# Patient Record
Sex: MALE | Race: WHITE | Employment: FULL TIME | URBAN - NONMETROPOLITAN AREA
[De-identification: names, ages, dates, MRNs, and addresses within clinical notes are randomized per-mention and may not be internally consistent; named-entity substitution may affect disease eponyms.]

---

## 2024-03-27 ENCOUNTER — OFFICE VISIT (OUTPATIENT)
Dept: FAMILY MEDICINE CLINIC | Age: 34
End: 2024-03-27
Payer: COMMERCIAL

## 2024-03-27 VITALS
DIASTOLIC BLOOD PRESSURE: 80 MMHG | SYSTOLIC BLOOD PRESSURE: 112 MMHG | HEART RATE: 62 BPM | OXYGEN SATURATION: 98 % | RESPIRATION RATE: 16 BRPM | TEMPERATURE: 98 F | HEIGHT: 73 IN | BODY MASS INDEX: 31.28 KG/M2 | WEIGHT: 236 LBS

## 2024-03-27 DIAGNOSIS — M25.551 RIGHT HIP PAIN: Primary | ICD-10-CM

## 2024-03-27 PROCEDURE — 99203 OFFICE O/P NEW LOW 30 MIN: CPT | Performed by: FAMILY MEDICINE

## 2024-03-27 PROCEDURE — 96372 THER/PROPH/DIAG INJ SC/IM: CPT | Performed by: FAMILY MEDICINE

## 2024-03-27 RX ORDER — PREDNISONE 5 MG/1
5 TABLET ORAL DAILY
Qty: 21 TABLET | Refills: 0 | Status: SHIPPED | OUTPATIENT
Start: 2024-03-27 | End: 2024-04-02

## 2024-03-27 RX ORDER — NAPROXEN 500 MG/1
500 TABLET ORAL 2 TIMES DAILY WITH MEALS
Qty: 60 TABLET | Refills: 1 | Status: SHIPPED | OUTPATIENT
Start: 2024-03-27 | End: 2024-05-26

## 2024-03-27 RX ORDER — METHYLPREDNISOLONE SODIUM SUCCINATE 125 MG/2ML
125 INJECTION, POWDER, LYOPHILIZED, FOR SOLUTION INTRAMUSCULAR; INTRAVENOUS ONCE
Status: COMPLETED | OUTPATIENT
Start: 2024-03-27 | End: 2024-03-27

## 2024-03-27 RX ORDER — METHYLPREDNISOLONE SODIUM SUCCINATE 125 MG/2ML
125 INJECTION, POWDER, LYOPHILIZED, FOR SOLUTION INTRAMUSCULAR; INTRAVENOUS ONCE
Status: DISCONTINUED | OUTPATIENT
Start: 2024-03-27 | End: 2024-04-01

## 2024-03-27 RX ADMIN — METHYLPREDNISOLONE SODIUM SUCCINATE 125 MG: 125 INJECTION, POWDER, LYOPHILIZED, FOR SOLUTION INTRAMUSCULAR; INTRAVENOUS at 17:17

## 2024-03-27 SDOH — ECONOMIC STABILITY: INCOME INSECURITY: HOW HARD IS IT FOR YOU TO PAY FOR THE VERY BASICS LIKE FOOD, HOUSING, MEDICAL CARE, AND HEATING?: NOT HARD AT ALL

## 2024-03-27 SDOH — ECONOMIC STABILITY: FOOD INSECURITY: WITHIN THE PAST 12 MONTHS, YOU WORRIED THAT YOUR FOOD WOULD RUN OUT BEFORE YOU GOT MONEY TO BUY MORE.: NEVER TRUE

## 2024-03-27 SDOH — ECONOMIC STABILITY: FOOD INSECURITY: WITHIN THE PAST 12 MONTHS, THE FOOD YOU BOUGHT JUST DIDN'T LAST AND YOU DIDN'T HAVE MONEY TO GET MORE.: NEVER TRUE

## 2024-03-27 SDOH — ECONOMIC STABILITY: HOUSING INSECURITY
IN THE LAST 12 MONTHS, WAS THERE A TIME WHEN YOU DID NOT HAVE A STEADY PLACE TO SLEEP OR SLEPT IN A SHELTER (INCLUDING NOW)?: NO

## 2024-03-27 ASSESSMENT — PATIENT HEALTH QUESTIONNAIRE - PHQ9
SUM OF ALL RESPONSES TO PHQ9 QUESTIONS 1 & 2: 0
SUM OF ALL RESPONSES TO PHQ QUESTIONS 1-9: 0
SUM OF ALL RESPONSES TO PHQ QUESTIONS 1-9: 0
1. LITTLE INTEREST OR PLEASURE IN DOING THINGS: NOT AT ALL
SUM OF ALL RESPONSES TO PHQ QUESTIONS 1-9: 0
SUM OF ALL RESPONSES TO PHQ QUESTIONS 1-9: 0
2. FEELING DOWN, DEPRESSED OR HOPELESS: NOT AT ALL

## 2024-03-27 NOTE — PROGRESS NOTES
SUBJECTIVE:    Patient ID: Clay Huber is a 34 y.o. male.    Chief Complaint   Patient presents with    New Patient    Hip Pain     X 7 days, right hip pain.         HPI: office visit    Review of Systems   Musculoskeletal:  Positive for arthralgias, gait problem and myalgias.   All other systems reviewed and are negative.       OBJECTIVE:  /80   Pulse 62   Temp 98 °F (36.7 °C) (Infrared)   Resp 16   Ht 1.854 m (6' 1\")   Wt 107 kg (236 lb)   SpO2 98% Comment: ra  BMI 31.14 kg/m²    Wt Readings from Last 3 Encounters:   03/27/24 107 kg (236 lb)   07/25/16 98 kg (216 lb)     BP Readings from Last 3 Encounters:   03/27/24 112/80   07/25/16 122/74      Pulse Readings from Last 3 Encounters:   03/27/24 62   07/25/16 60     Body mass index is 31.14 kg/m².   Resp Readings from Last 3 Encounters:   03/27/24 16   07/25/16 16     Past medical, surgical, family and social history were reviewed and updated with the patient.     Physical Exam  Vitals and nursing note reviewed.   Constitutional:       Appearance: He is well-developed.   HENT:      Head: Normocephalic and atraumatic.   Eyes:      Pupils: Pupils are equal, round, and reactive to light.   Cardiovascular:      Rate and Rhythm: Normal rate and regular rhythm.      Heart sounds: Normal heart sounds.   Pulmonary:      Effort: Pulmonary effort is normal.      Breath sounds: Normal breath sounds.   Abdominal:      General: Bowel sounds are normal.      Palpations: Abdomen is soft.   Musculoskeletal:      Cervical back: Normal range of motion and neck supple.      Lumbar back: Spasms and tenderness present. Decreased range of motion.      Right hip: Tenderness present. Decreased range of motion. Decreased strength.   Skin:     General: Skin is warm and dry.   Neurological:      Mental Status: He is alert and oriented to person, place, and time.          No results found for requested labs within last 30 days.     No results found for: \"LABA1C\", \"LDLCALC\",

## 2024-03-27 NOTE — PROGRESS NOTES
Chief Complaint   Patient presents with    New Patient    Hip Pain     X 7 days, right hip pain.         Have you seen any other physician or provider since your last visit no    Have you had any other diagnostic tests since your last visit? no    Have you changed or stopped any medications since your last visit? no   Administrations This Visit       methylPREDNISolone sodium succ (SOLU-MEDROL) injection 125 mg       Admin Date  03/27/2024  17:17 Action  Given Dose  125 mg Route  IntraMUSCular Site  Dorsogluteal Right Administered By  Veronica Villatoro MA    Ordering Provider: Trixie Swanson MD    NDC: 52303-117-67    : [employer]    Patient Supplied?: No                 Patient tolerated injection well. Patient advised to wait 20 minutes in the office following the injection. No signs/symptoms of reaction noted after 20 minutes.

## 2024-06-04 ENCOUNTER — OFFICE VISIT (OUTPATIENT)
Dept: FAMILY MEDICINE CLINIC | Age: 34
End: 2024-06-04
Payer: COMMERCIAL

## 2024-06-04 ENCOUNTER — HOSPITAL ENCOUNTER (OUTPATIENT)
Facility: HOSPITAL | Age: 34
Discharge: HOME | End: 2024-06-04
Payer: COMMERCIAL

## 2024-06-04 VITALS
WEIGHT: 231.8 LBS | TEMPERATURE: 97.7 F | DIASTOLIC BLOOD PRESSURE: 82 MMHG | SYSTOLIC BLOOD PRESSURE: 116 MMHG | OXYGEN SATURATION: 98 % | BODY MASS INDEX: 30.72 KG/M2 | HEART RATE: 77 BPM | HEIGHT: 73 IN | RESPIRATION RATE: 16 BRPM

## 2024-06-04 DIAGNOSIS — Z12.5 SCREENING FOR PROSTATE CANCER: ICD-10-CM

## 2024-06-04 DIAGNOSIS — Z13.220 SCREENING, LIPID: ICD-10-CM

## 2024-06-04 DIAGNOSIS — R53.83 OTHER FATIGUE: ICD-10-CM

## 2024-06-04 DIAGNOSIS — E55.9 VITAMIN D DEFICIENCY: ICD-10-CM

## 2024-06-04 DIAGNOSIS — M25.551 RIGHT HIP PAIN: Primary | ICD-10-CM

## 2024-06-04 PROCEDURE — 80061 LIPID PANEL: CPT

## 2024-06-04 PROCEDURE — 80053 COMPREHEN METABOLIC PANEL: CPT

## 2024-06-04 PROCEDURE — 36415 COLL VENOUS BLD VENIPUNCTURE: CPT

## 2024-06-04 PROCEDURE — 99214 OFFICE O/P EST MOD 30 MIN: CPT | Performed by: FAMILY MEDICINE

## 2024-06-04 PROCEDURE — 85027 COMPLETE CBC AUTOMATED: CPT

## 2024-06-04 PROCEDURE — 82607 VITAMIN B-12: CPT

## 2024-06-04 PROCEDURE — 84153 ASSAY OF PSA TOTAL: CPT

## 2024-06-04 PROCEDURE — 82746 ASSAY OF FOLIC ACID SERUM: CPT

## 2024-06-04 PROCEDURE — 82306 VITAMIN D 25 HYDROXY: CPT

## 2024-06-04 PROCEDURE — 84443 ASSAY THYROID STIM HORMONE: CPT

## 2024-06-04 PROCEDURE — 84439 ASSAY OF FREE THYROXINE: CPT

## 2024-06-04 RX ORDER — MELOXICAM 15 MG/1
15 TABLET ORAL DAILY
COMMUNITY
Start: 2024-05-14

## 2024-06-04 NOTE — PROGRESS NOTES
SUBJECTIVE:    Patient ID: Clay Huber is a 34 y.o. male.    Chief Complaint   Patient presents with    Hip Pain     Right hip pain, patient started PT.        HPI: office visit  He is in the office today for follow-up with his hip pain.  He is doing some physical therapy.  He has been to see the specialist.  They did think he has a torn labrum in that hip.  He says it is still hurting him quite a bit.  He says he is trying to be active.  His blood pressures have been doing okay.  He is not having any chest pain or shortness of breath.  He is needing some screening lab work.    Review of Systems   Musculoskeletal:  Positive for arthralgias, gait problem and myalgias.   All other systems reviewed and are negative.       OBJECTIVE:  /82   Pulse 77   Temp 97.7 °F (36.5 °C) (Infrared)   Resp 16   Ht 1.854 m (6' 1\")   Wt 105.1 kg (231 lb 12.8 oz)   SpO2 98% Comment: ra  BMI 30.58 kg/m²    Wt Readings from Last 3 Encounters:   06/04/24 105.1 kg (231 lb 12.8 oz)   03/27/24 107 kg (236 lb)   07/25/16 98 kg (216 lb)     BP Readings from Last 3 Encounters:   06/04/24 116/82   03/27/24 112/80   07/25/16 122/74      Pulse Readings from Last 3 Encounters:   06/04/24 77   03/27/24 62   07/25/16 60     Body mass index is 30.58 kg/m².   Resp Readings from Last 3 Encounters:   06/04/24 16   03/27/24 16   07/25/16 16     Past medical, surgical, family and social history were reviewed and updated with the patient.     Physical Exam  Vitals and nursing note reviewed.   Constitutional:       Appearance: He is well-developed.   HENT:      Head: Normocephalic and atraumatic.   Eyes:      Pupils: Pupils are equal, round, and reactive to light.   Cardiovascular:      Rate and Rhythm: Normal rate and regular rhythm.      Heart sounds: Normal heart sounds.   Pulmonary:      Effort: Pulmonary effort is normal.      Breath sounds: Normal breath sounds.   Abdominal:      General: Bowel sounds are normal.      Palpations: Abdomen

## 2024-06-04 NOTE — PROGRESS NOTES
No chief complaint on file.      Have you seen any other physician or provider since your last visit yes -   Bubba LOVETT [facility] Specialist  [facility]    Have you had any other diagnostic tests since your last visit? Yes   Right Arthrogram  MRI   Xray hip  Xray lumbar spine    Have you changed or stopped any medications since your last visit? no

## 2024-06-05 DIAGNOSIS — E87.5 SERUM POTASSIUM ELEVATED: Primary | ICD-10-CM

## 2024-06-05 LAB
25(OH)D3 SERPL-MCNC: 27.1 NG/ML (ref 32–100)
ALBUMIN SERPL-MCNC: 4.7 G/DL (ref 3.4–4.8)
ALBUMIN/GLOB SERPL: 1.7 {RATIO} (ref 0.8–2)
ALP SERPL-CCNC: 50 U/L (ref 25–100)
ALT SERPL-CCNC: 28 U/L (ref 4–36)
ANION GAP SERPL CALCULATED.3IONS-SCNC: 12 MMOL/L (ref 3–16)
AST SERPL-CCNC: 23 U/L (ref 8–33)
BILIRUB SERPL-MCNC: 0.6 MG/DL (ref 0.3–1.2)
BUN SERPL-MCNC: 12 MG/DL (ref 6–20)
CALCIUM SERPL-MCNC: 10 MG/DL (ref 8.5–10.5)
CHLORIDE SERPL-SCNC: 101 MMOL/L (ref 98–107)
CHOLEST SERPL-MCNC: 215 MG/DL (ref 0–200)
CO2 SERPL-SCNC: 27 MMOL/L (ref 20–30)
CREAT SERPL-MCNC: 0.9 MG/DL (ref 0.4–1.2)
ERYTHROCYTE [DISTWIDTH] IN BLOOD BY AUTOMATED COUNT: 12.5 % (ref 11–16)
FOLATE SERPL-MCNC: 11.89 NG/ML
GFR SERPLBLD CREATININE-BSD FMLA CKD-EPI: >90 ML/MIN/{1.73_M2}
GLOBULIN SER CALC-MCNC: 2.7 G/DL
GLUCOSE SERPL-MCNC: 74 MG/DL (ref 74–106)
HCT VFR BLD AUTO: 42.5 % (ref 40–54)
HDLC SERPL-MCNC: 53 MG/DL (ref 40–60)
HGB BLD-MCNC: 14.1 G/DL (ref 13–18)
LDLC SERPL CALC-MCNC: 146 MG/DL
MCH RBC QN AUTO: 28.7 PG (ref 27–32)
MCHC RBC AUTO-ENTMCNC: 33.2 G/DL (ref 31–35)
MCV RBC AUTO: 86.6 FL (ref 80–100)
PLATELET # BLD AUTO: 462 K/UL (ref 150–400)
PMV BLD AUTO: 10.2 FL (ref 6–10)
POTASSIUM SERPL-SCNC: 5.7 MMOL/L (ref 3.4–5.1)
PROT SERPL-MCNC: 7.4 G/DL (ref 6.4–8.3)
PSA SERPL DL<=0.01 NG/ML-MCNC: 1.86 NG/ML (ref 0–4)
RBC # BLD AUTO: 4.91 M/UL (ref 4.5–6)
SODIUM SERPL-SCNC: 140 MMOL/L (ref 136–145)
T4 FREE SERPL-MCNC: 1.3 NG/DL (ref 0.92–1.68)
TRIGL SERPL-MCNC: 79 MG/DL (ref 0–249)
TSH SERPL DL<=0.005 MIU/L-ACNC: 1.23 UIU/ML (ref 0.27–4.2)
VIT B12 SERPL-MCNC: 461 PG/ML (ref 211–911)
VLDLC SERPL CALC-MCNC: 16 MG/DL
WBC # BLD AUTO: 8.1 K/UL (ref 4–11)

## 2024-07-24 RX ORDER — METHOCARBAMOL 750 MG/1
750 TABLET, FILM COATED ORAL 4 TIMES DAILY PRN
Qty: 90 TABLET | Refills: 1 | Status: SHIPPED | OUTPATIENT
Start: 2024-07-24 | End: 2024-08-03